# Patient Record
Sex: MALE | Race: WHITE | NOT HISPANIC OR LATINO | Employment: FULL TIME | URBAN - METROPOLITAN AREA
[De-identification: names, ages, dates, MRNs, and addresses within clinical notes are randomized per-mention and may not be internally consistent; named-entity substitution may affect disease eponyms.]

---

## 2023-02-04 ENCOUNTER — APPOINTMENT (OUTPATIENT)
Dept: CT IMAGING | Facility: CLINIC | Age: 63
End: 2023-02-04
Attending: EMERGENCY MEDICINE
Payer: COMMERCIAL

## 2023-02-04 ENCOUNTER — HOSPITAL ENCOUNTER (EMERGENCY)
Facility: CLINIC | Age: 63
Discharge: HOME | End: 2023-02-04
Attending: EMERGENCY MEDICINE | Admitting: EMERGENCY MEDICINE
Payer: COMMERCIAL

## 2023-02-04 VITALS
RESPIRATION RATE: 18 BRPM | HEART RATE: 67 BPM | DIASTOLIC BLOOD PRESSURE: 88 MMHG | OXYGEN SATURATION: 98 % | SYSTOLIC BLOOD PRESSURE: 148 MMHG | TEMPERATURE: 98.3 F

## 2023-02-04 DIAGNOSIS — R10.13 EPIGASTRIC PAIN: ICD-10-CM

## 2023-02-04 LAB
ALBUMIN SERPL BCG-MCNC: 4.7 G/DL (ref 3.5–5.2)
ALP SERPL-CCNC: 53 U/L (ref 40–129)
ALT SERPL W P-5'-P-CCNC: 42 U/L (ref 10–50)
ANION GAP SERPL CALCULATED.3IONS-SCNC: 11 MMOL/L (ref 7–15)
AST SERPL W P-5'-P-CCNC: 32 U/L (ref 10–50)
BASOPHILS # BLD AUTO: 0 10E3/UL (ref 0–0.2)
BASOPHILS NFR BLD AUTO: 1 %
BILIRUB DIRECT SERPL-MCNC: <0.2 MG/DL (ref 0–0.3)
BILIRUB SERPL-MCNC: 0.6 MG/DL
BUN SERPL-MCNC: 13.8 MG/DL (ref 8–23)
CALCIUM SERPL-MCNC: 9.4 MG/DL (ref 8.8–10.2)
CHLORIDE SERPL-SCNC: 104 MMOL/L (ref 98–107)
CREAT SERPL-MCNC: 0.93 MG/DL (ref 0.67–1.17)
DEPRECATED HCO3 PLAS-SCNC: 25 MMOL/L (ref 22–29)
EOSINOPHIL # BLD AUTO: 0.1 10E3/UL (ref 0–0.7)
EOSINOPHIL NFR BLD AUTO: 1 %
ERYTHROCYTE [DISTWIDTH] IN BLOOD BY AUTOMATED COUNT: 11.9 % (ref 10–15)
GFR SERPL CREATININE-BSD FRML MDRD: >90 ML/MIN/1.73M2
GLUCOSE SERPL-MCNC: 123 MG/DL (ref 70–99)
HCT VFR BLD AUTO: 40.6 % (ref 40–53)
HGB BLD-MCNC: 13.9 G/DL (ref 13.3–17.7)
HOLD SPECIMEN: NORMAL
HOLD SPECIMEN: NORMAL
IMM GRANULOCYTES # BLD: 0 10E3/UL
IMM GRANULOCYTES NFR BLD: 0 %
LIPASE SERPL-CCNC: 26 U/L (ref 13–60)
LYMPHOCYTES # BLD AUTO: 1.4 10E3/UL (ref 0.8–5.3)
LYMPHOCYTES NFR BLD AUTO: 26 %
MCH RBC QN AUTO: 34.8 PG (ref 26.5–33)
MCHC RBC AUTO-ENTMCNC: 34.2 G/DL (ref 31.5–36.5)
MCV RBC AUTO: 102 FL (ref 78–100)
MONOCYTES # BLD AUTO: 0.5 10E3/UL (ref 0–1.3)
MONOCYTES NFR BLD AUTO: 9 %
NEUTROPHILS # BLD AUTO: 3.3 10E3/UL (ref 1.6–8.3)
NEUTROPHILS NFR BLD AUTO: 63 %
NRBC # BLD AUTO: 0 10E3/UL
NRBC BLD AUTO-RTO: 0 /100
PLATELET # BLD AUTO: 242 10E3/UL (ref 150–450)
POTASSIUM SERPL-SCNC: 4 MMOL/L (ref 3.4–5.3)
PROT SERPL-MCNC: 7.2 G/DL (ref 6.4–8.3)
RBC # BLD AUTO: 4 10E6/UL (ref 4.4–5.9)
SODIUM SERPL-SCNC: 140 MMOL/L (ref 136–145)
TROPONIN T SERPL HS-MCNC: <6 NG/L
WBC # BLD AUTO: 5.2 10E3/UL (ref 4–11)

## 2023-02-04 PROCEDURE — 82248 BILIRUBIN DIRECT: CPT | Performed by: EMERGENCY MEDICINE

## 2023-02-04 PROCEDURE — 74177 CT ABD & PELVIS W/CONTRAST: CPT

## 2023-02-04 PROCEDURE — 93005 ELECTROCARDIOGRAM TRACING: CPT

## 2023-02-04 PROCEDURE — 99285 EMERGENCY DEPT VISIT HI MDM: CPT | Mod: 25

## 2023-02-04 PROCEDURE — 83690 ASSAY OF LIPASE: CPT | Performed by: EMERGENCY MEDICINE

## 2023-02-04 PROCEDURE — 80053 COMPREHEN METABOLIC PANEL: CPT | Performed by: EMERGENCY MEDICINE

## 2023-02-04 PROCEDURE — 250N000009 HC RX 250: Performed by: EMERGENCY MEDICINE

## 2023-02-04 PROCEDURE — 85004 AUTOMATED DIFF WBC COUNT: CPT | Performed by: EMERGENCY MEDICINE

## 2023-02-04 PROCEDURE — 250N000011 HC RX IP 250 OP 636: Performed by: EMERGENCY MEDICINE

## 2023-02-04 PROCEDURE — 84484 ASSAY OF TROPONIN QUANT: CPT | Performed by: EMERGENCY MEDICINE

## 2023-02-04 PROCEDURE — 36415 COLL VENOUS BLD VENIPUNCTURE: CPT | Performed by: EMERGENCY MEDICINE

## 2023-02-04 RX ORDER — IOPAMIDOL 755 MG/ML
500 INJECTION, SOLUTION INTRAVASCULAR ONCE
Status: COMPLETED | OUTPATIENT
Start: 2023-02-04 | End: 2023-02-04

## 2023-02-04 RX ADMIN — IOPAMIDOL 80 ML: 755 INJECTION, SOLUTION INTRAVENOUS at 16:27

## 2023-02-04 RX ADMIN — SODIUM CHLORIDE 60 ML: 9 INJECTION, SOLUTION INTRAVENOUS at 16:27

## 2023-02-04 ASSESSMENT — ENCOUNTER SYMPTOMS
DIZZINESS: 1
COUGH: 0
SHORTNESS OF BREATH: 0
FEVER: 0
NAUSEA: 0
CHILLS: 0
ABDOMINAL PAIN: 1

## 2023-02-04 ASSESSMENT — ACTIVITIES OF DAILY LIVING (ADL): ADLS_ACUITY_SCORE: 35

## 2023-02-04 NOTE — ED PROVIDER NOTES
"  History     Chief Complaint:  Epigastric discomfort     The history is provided by the patient.      Gomez Childress is a 62 year old male with a history of atrial fibrillation who presents with epigastric discomfort. Patient complains of eipgastric discomfort which causes a \"burning aching\" sensation on the left side of his chest and radiates upward. He has consulted with gastroenterology and they are aware of this issue. After his consult, he was given Protonix, which provides no relief (has also taken Tylenol and Aspirin, which also do not provide relief). The pain is no different after he eats.  Endorses dizziness in the ER and describes his heart \"skipping a beat\" when he swallows. Denies nausea, fever, chills, cough, shortness of breath, edema. BP was 135/75 at home.       Independent Historian:   None - Patient Only      ROS:  Review of Systems   Constitutional: Negative for chills and fever.   Respiratory: Negative for cough and shortness of breath.    Cardiovascular: Negative for leg swelling.   Gastrointestinal: Positive for abdominal pain. Negative for nausea.   Neurological: Positive for dizziness.   All other systems reviewed and are negative.      Allergies:  No Known Allergies     Medications:    Amlodipine  Lipitor  Pepcid  Pyridoxine     Past Medical History:    Celiac disease  Hypertension  Macrocytosis without anemia   Liver lesion  Rosacea   BPH with obstruction  Caliectasis  Renal cyst   Hyperlipidemia   A-fib  Anxiety  Globus sensation  Raynaud's phenomenon   Hemorrhoids   GERD  L clavicle fracture   Alcohol abuse    Family History:    Father - Pacemaker in place  Mother - Colon cancer     Social History:  Presents with spouse   PCP: [facility], [place]     Physical Exam     Patient Vitals for the past 24 hrs:   BP Temp Temp src Pulse Resp SpO2   02/04/23 1700 (!) 148/88 -- -- 67 -- 98 %   02/04/23 1640 (!) 155/93 -- -- 77 -- 98 %   02/04/23 1610 (!) 150/92 -- -- 75 -- 99 %   02/04/23 " 1603 (!) 161/91 -- -- 87 -- 98 %   02/04/23 1545 (!) 179/93 98.3  F (36.8  C) Oral 89 18 99 %        Physical Exam  General: Patient is awake, alert and interactive when I enter the room.    Head: The scalp, face, and head appear normal  Eyes: Conjunctivae and sclerae are normal  Neck: Normal range of motion.   CV: Regular rate.   Resp:  No respiratory distress.   GI: epigastric tenderness but abdomen is soft, no rigidity. No evidence of pulsatile mass. No fluid waves or evidence of ascites. No distension. No hernias or bruising are noted in detailed exam. No CVA tenderness.    MS: Normal tone.   Skin: Normal capillary refill noted  Neuro: Speech is normal and fluent. Face is symmetric. Moving all extremities.   Psych:  Normal affect.  Appropriate interactions.    Emergency Department Course   ECG  Normal sinus rhythm  Normal EKG  Ventricular rate 99    Imaging:  CT Abdomen Pelvis w Contrast   Final Result   IMPRESSION:    1.  No acute findings or other explanation for abdominal pain.      2.  Hepatic steatosis.      3.  Fecalized contents throughout the ileum, which can be seen with chronic delayed transit.         Report per radiology    Laboratory:  Labs Ordered and Resulted from Time of ED Arrival to Time of ED Departure   BASIC METABOLIC PANEL - Abnormal       Result Value    Sodium 140      Potassium 4.0      Chloride 104      Carbon Dioxide (CO2) 25      Anion Gap 11      Urea Nitrogen 13.8      Creatinine 0.93      Calcium 9.4      Glucose 123 (*)     GFR Estimate >90     CBC WITH PLATELETS AND DIFFERENTIAL - Abnormal    WBC Count 5.2      RBC Count 4.00 (*)     Hemoglobin 13.9      Hematocrit 40.6       (*)     MCH 34.8 (*)     MCHC 34.2      RDW 11.9      Platelet Count 242      % Neutrophils 63      % Lymphocytes 26      % Monocytes 9      % Eosinophils 1      % Basophils 1      % Immature Granulocytes 0      NRBCs per 100 WBC 0      Absolute Neutrophils 3.3      Absolute Lymphocytes 1.4       Absolute Monocytes 0.5      Absolute Eosinophils 0.1      Absolute Basophils 0.0      Absolute Immature Granulocytes 0.0      Absolute NRBCs 0.0     TROPONIN T, HIGH SENSITIVITY - Normal    Troponin T, High Sensitivity <6     HEPATIC FUNCTION PANEL - Normal    Protein Total 7.2      Albumin 4.7      Bilirubin Total 0.6      Alkaline Phosphatase 53      AST 32      ALT 42      Bilirubin Direct <0.20     LIPASE - Normal    Lipase 26          Emergency Department Course & Assessments:    Interventions:  Medications   sodium chloride for CT scan flush use (60 mLs Intravenous Given 2/4/23 1627)   iopamidol (ISOVUE-370) solution 500 mL (80 mLs Intravenous Given 2/4/23 1627)        Independent Interpretation (X-rays, CTs, rhythm strip):  N/A     Assessments/Consultations/Discussion of Management or Tests:   ED Course as of 02/04/23 1716   Sat Feb 04, 2023   1608 I obtained history and examined the patient.        Social Determinants of Health affecting care:   None    Disposition:  The patient was discharged to home.     Impression & Plan      CMS Diagnoses: None    Medical Decision Making:  This 62-year-old gentleman who presents emergency department with epigastric discomfort that radiates into his chest.  No significant exertional component or pain worse with eating.  Has been following with GI but has not had a recent endoscopy.  Presents evaluation he does have some epigastric tenderness but no significant guarding or rebound.  CT scan was obtained which shows some evidence of hepatic steatosis and delayed transition but no significant surgical pathology or explanation for his epigastric pain.  The remainder of his work-up was reassuring.  No evidence of pancreatitis or hepatitis.  Cardiac equivalent was considered but given a negative troponin and nonischemic EKG this is much less likely.  We will have the patient follow-up with his gastroenterologist to have upper endoscopy to further evaluate his epigastric pain.   I advised him to continue to take Tums and omeprazole for his epigastric discomfort.  Low concern for cholecystitis given no right upper quadrant tenderness and negative CT scan.    Diagnosis:    ICD-10-CM    1. Epigastric pain  R10.13            Scribe Disclosure:  I, MACK ZALDIVAR, am serving as a scribe at 4:08 PM on 2/4/2023 to document services personally performed by Douglas Jarvis MD based on my observations and the provider's statements to me.   2/4/2023   Douglas Jarvis MD Battista, Christopher Joseph, MD  02/04/23 0684

## 2023-02-04 NOTE — ED TRIAGE NOTES
Patient reports a dull ache in his chest for several months. Today patient states that he started feeling pain more on the left side and had an episode of dizziness     Triage Assessment     Row Name 02/04/23 5766       Triage Assessment (Adult)    Airway WDL WDL       Respiratory WDL    Respiratory WDL WDL       Skin Circulation/Temperature WDL    Skin Circulation/Temperature WDL WDL       Cardiac WDL    Cardiac WDL WDL       Peripheral/Neurovascular WDL    Peripheral Neurovascular WDL WDL       Cognitive/Neuro/Behavioral WDL    Cognitive/Neuro/Behavioral WDL WDL

## 2023-02-06 LAB
ATRIAL RATE - MUSE: 100 BPM
DIASTOLIC BLOOD PRESSURE - MUSE: NORMAL MMHG
INTERPRETATION ECG - MUSE: NORMAL
P AXIS - MUSE: 61 DEGREES
PR INTERVAL - MUSE: 122 MS
QRS DURATION - MUSE: 100 MS
QT - MUSE: 354 MS
QTC - MUSE: 454 MS
R AXIS - MUSE: 75 DEGREES
SYSTOLIC BLOOD PRESSURE - MUSE: NORMAL MMHG
T AXIS - MUSE: 51 DEGREES
VENTRICULAR RATE- MUSE: 99 BPM

## 2023-09-03 ENCOUNTER — HEALTH MAINTENANCE LETTER (OUTPATIENT)
Age: 63
End: 2023-09-03

## 2024-10-27 ENCOUNTER — HEALTH MAINTENANCE LETTER (OUTPATIENT)
Age: 64
End: 2024-10-27

## 2025-07-06 ENCOUNTER — HEALTH MAINTENANCE LETTER (OUTPATIENT)
Age: 65
End: 2025-07-06